# Patient Record
Sex: MALE | Race: WHITE | NOT HISPANIC OR LATINO
[De-identification: names, ages, dates, MRNs, and addresses within clinical notes are randomized per-mention and may not be internally consistent; named-entity substitution may affect disease eponyms.]

---

## 2024-09-11 ENCOUNTER — DASHBOARD ENCOUNTERS (OUTPATIENT)
Age: 64
End: 2024-09-11

## 2024-09-11 ENCOUNTER — OFFICE VISIT (OUTPATIENT)
Dept: URBAN - METROPOLITAN AREA CLINIC 74 | Facility: CLINIC | Age: 64
End: 2024-09-11
Payer: COMMERCIAL

## 2024-09-11 ENCOUNTER — LAB OUTSIDE AN ENCOUNTER (OUTPATIENT)
Dept: URBAN - METROPOLITAN AREA CLINIC 74 | Facility: CLINIC | Age: 64
End: 2024-09-11

## 2024-09-11 VITALS
HEART RATE: 101 BPM | DIASTOLIC BLOOD PRESSURE: 76 MMHG | TEMPERATURE: 97.9 F | SYSTOLIC BLOOD PRESSURE: 132 MMHG | OXYGEN SATURATION: 95 % | BODY MASS INDEX: 37.99 KG/M2 | WEIGHT: 312 LBS | HEIGHT: 76 IN

## 2024-09-11 DIAGNOSIS — K64.1 GRADE II HEMORRHOIDS: ICD-10-CM

## 2024-09-11 DIAGNOSIS — K62.5 RECTAL BLEEDING: ICD-10-CM

## 2024-09-11 PROCEDURE — 46221 LIGATION OF HEMORRHOID(S): CPT | Performed by: INTERNAL MEDICINE

## 2024-09-11 RX ORDER — LIDOCAINE 50 MG/G
APPLY TO AFFECTED AREA(S) BY TOPICAL ROUTE PRN, UP TO 8 TIMES DAILY AS NEEDED OINTMENT TOPICAL
Qty: 1 | Refills: 2 | Status: ON HOLD | COMMUNITY
Start: 2018-08-27

## 2024-09-11 RX ORDER — LISINOPRIL 10 MG/1
TAKE 1 TABLET (10 MG) BY ORAL ROUTE ONCE DAILY TABLET ORAL 1
Qty: 0 | Refills: 0 | Status: ACTIVE | COMMUNITY
Start: 1900-01-01

## 2024-09-11 NOTE — HPI-TODAY'S VISIT:
--Patient presents for evaluation of hemorrhoids.  Longstanding history of hemorrhoids with pain burning and itching. --Prior banding x4, last banding was 2018.  --History of colon polyps, benign.  --Colonoscopy 2018.  Small benign polyp removed at that time.  Pathology was nonspecific lymphoid aggregate and a repeat was recommended 10 years, 2028.

## 2024-10-30 ENCOUNTER — TELEPHONE ENCOUNTER (OUTPATIENT)
Dept: URBAN - METROPOLITAN AREA CLINIC 74 | Facility: CLINIC | Age: 64
End: 2024-10-30

## 2024-11-20 ENCOUNTER — OFFICE VISIT (OUTPATIENT)
Dept: URBAN - METROPOLITAN AREA CLINIC 74 | Facility: CLINIC | Age: 64
End: 2024-11-20
Payer: COMMERCIAL

## 2024-11-20 VITALS
HEART RATE: 94 BPM | OXYGEN SATURATION: 95 % | HEIGHT: 76 IN | DIASTOLIC BLOOD PRESSURE: 62 MMHG | WEIGHT: 313 LBS | TEMPERATURE: 97.7 F | BODY MASS INDEX: 38.11 KG/M2 | SYSTOLIC BLOOD PRESSURE: 102 MMHG

## 2024-11-20 DIAGNOSIS — Z79.01 BLOOD THINNED DUE TO LONG-TERM ANTICOAGULANT USE: ICD-10-CM

## 2024-11-20 DIAGNOSIS — K64.1 GRADE II HEMORRHOIDS: ICD-10-CM

## 2024-11-20 DIAGNOSIS — K62.5 RECTAL BLEEDING: ICD-10-CM

## 2024-11-20 DIAGNOSIS — Z86.718 HISTORY OF DVT (DEEP VEIN THROMBOSIS): ICD-10-CM

## 2024-11-20 PROBLEM — 711150003: Status: ACTIVE | Noted: 2024-11-20

## 2024-11-20 PROCEDURE — 99213 OFFICE O/P EST LOW 20 MIN: CPT | Performed by: INTERNAL MEDICINE

## 2024-11-20 RX ORDER — LIDOCAINE 50 MG/G
APPLY TO AFFECTED AREA(S) BY TOPICAL ROUTE PRN, UP TO 8 TIMES DAILY AS NEEDED OINTMENT TOPICAL
Qty: 1 | Refills: 2 | Status: ON HOLD | COMMUNITY
Start: 2018-08-27

## 2024-11-20 RX ORDER — LISINOPRIL 10 MG/1
TAKE 1 TABLET (10 MG) BY ORAL ROUTE ONCE DAILY TABLET ORAL 1
Qty: 0 | Refills: 0 | Status: ON HOLD | COMMUNITY
Start: 1900-01-01

## 2024-11-20 NOTE — HPI-TODAY'S VISIT:
--Patient presents for evaluation of hemorrhoids.  Longstanding history of hemorrhoids with pain burning and itching. --Prior banding x5. --Summary: pt seen 2 months ago for hemorrhoids and banded (5th total). Colonoscopy was scheduled for surveillance and we planned to continue banding hemorrhoids at time of colonoscopy. --He had DVT after that visit, now on Xarelto for 3-6 months. Colonoscopy was cancelled. Pt informed that we can't band hemorrhoids while on blood thinners.  --History of colon polyps, benign.  --Colonoscopy 2018.  Small benign polyp removed at that time.  Pathology was nonspecific lymphoid aggregate and a repeat was recommended 10 years, 2028.

## 2024-11-21 ENCOUNTER — OFFICE VISIT (OUTPATIENT)
Dept: URBAN - METROPOLITAN AREA SURGERY CENTER 30 | Facility: SURGERY CENTER | Age: 64
End: 2024-11-21

## 2025-07-09 ENCOUNTER — OFFICE VISIT (OUTPATIENT)
Dept: URBAN - METROPOLITAN AREA CLINIC 74 | Facility: CLINIC | Age: 65
End: 2025-07-09

## 2025-07-09 ENCOUNTER — LAB OUTSIDE AN ENCOUNTER (OUTPATIENT)
Dept: URBAN - METROPOLITAN AREA CLINIC 74 | Facility: CLINIC | Age: 65
End: 2025-07-09

## 2025-07-09 ENCOUNTER — OFFICE VISIT (OUTPATIENT)
Dept: URBAN - METROPOLITAN AREA CLINIC 74 | Facility: CLINIC | Age: 65
End: 2025-07-09
Payer: COMMERCIAL

## 2025-07-09 DIAGNOSIS — K64.1 GRADE II HEMORRHOIDS: ICD-10-CM

## 2025-07-09 PROCEDURE — 99212 OFFICE O/P EST SF 10 MIN: CPT | Performed by: INTERNAL MEDICINE

## 2025-07-09 PROCEDURE — 46221 LIGATION OF HEMORRHOID(S): CPT | Performed by: INTERNAL MEDICINE

## 2025-07-09 RX ORDER — LIDOCAINE 50 MG/G
APPLY TO AFFECTED AREA(S) BY TOPICAL ROUTE PRN, UP TO 8 TIMES DAILY AS NEEDED OINTMENT TOPICAL
Qty: 1 | Refills: 2 | Status: ON HOLD | COMMUNITY
Start: 2018-08-27

## 2025-07-09 RX ORDER — LISINOPRIL 10 MG/1
TAKE 1 TABLET (10 MG) BY ORAL ROUTE ONCE DAILY TABLET ORAL 1
Qty: 0 | Refills: 0 | Status: ACTIVE | COMMUNITY
Start: 1900-01-01

## 2025-07-09 NOTE — HPI-TODAY'S VISIT:
--Patient presents for evaluation of hemorrhoids.  Longstanding history of hemorrhoids with pain burning and itching. --Prior banding x5. --He was seen 11/2024 for similar c/o and he was on Xarelto. We deferred evaluation until now and until Xarelto d/c'd.  --Summary: pt seen a few months ago for hemorrhoids and banded prior (5th total). Colonoscopy was scheduled for surveillance and we planned to continue banding hemorrhoids at time of colonoscopy. --He had DVT after that visit, was on Xarelto for 3-6 months. Colonoscopy was cancelled. Pt informed that we can't band hemorrhoids while on blood thinners.  --History of colon polyps, benign.  --Colonoscopy 2018.  Small benign polyp removed at that time.  Pathology was nonspecific lymphoid aggregate and a repeat was recommended 10 years, 2028.

## 2025-08-04 ENCOUNTER — OFFICE VISIT (OUTPATIENT)
Dept: URBAN - METROPOLITAN AREA CLINIC 74 | Facility: CLINIC | Age: 65
End: 2025-08-04